# Patient Record
Sex: MALE | Race: WHITE | NOT HISPANIC OR LATINO | URBAN - METROPOLITAN AREA
[De-identification: names, ages, dates, MRNs, and addresses within clinical notes are randomized per-mention and may not be internally consistent; named-entity substitution may affect disease eponyms.]

---

## 2022-05-10 ENCOUNTER — NEW PATIENT (OUTPATIENT)
Dept: URBAN - METROPOLITAN AREA CLINIC 14 | Facility: CLINIC | Age: 72
End: 2022-05-10

## 2022-05-10 DIAGNOSIS — H02.834: ICD-10-CM

## 2022-05-10 DIAGNOSIS — H02.831: ICD-10-CM

## 2022-05-10 DIAGNOSIS — H02.423: ICD-10-CM

## 2022-05-10 PROCEDURE — 99204 OFFICE O/P NEW MOD 45 MIN: CPT

## 2022-05-10 PROCEDURE — 92285 EXTERNAL OCULAR PHOTOGRAPHY: CPT

## 2022-05-10 PROCEDURE — 92082 INTERMEDIATE VISUAL FIELD XM: CPT

## 2022-05-10 ASSESSMENT — VISUAL ACUITY
OS_SC: 20/30
OD_SC: 20/30

## 2022-05-10 NOTE — PATIENT DISCUSSION
I have discussed with the patient the option of levator advancement surgery to lift the upper eyelid position and improve the functional visual field.

## 2022-06-29 ENCOUNTER — POST-OP (OUTPATIENT)
Dept: URBAN - METROPOLITAN AREA CLINIC 14 | Facility: CLINIC | Age: 72
End: 2022-06-29

## 2022-06-29 DIAGNOSIS — H02.834: ICD-10-CM

## 2022-06-29 DIAGNOSIS — Z98.890: ICD-10-CM

## 2022-06-29 DIAGNOSIS — H02.423: ICD-10-CM

## 2022-06-29 DIAGNOSIS — H02.831: ICD-10-CM

## 2022-06-29 PROCEDURE — 99024 POSTOP FOLLOW-UP VISIT: CPT

## 2022-06-29 ASSESSMENT — VISUAL ACUITY
OD_SC: 20/30
OS_SC: 20/30

## 2024-12-02 ENCOUNTER — COMPREHENSIVE EXAM (OUTPATIENT)
Age: 74
End: 2024-12-02

## 2024-12-02 DIAGNOSIS — H25.13: ICD-10-CM

## 2024-12-02 PROCEDURE — 99199ADVT ADVANCED VISION TESTING: Mod: NC

## 2024-12-02 PROCEDURE — 99211NC NO CHARGE VISIT: Mod: NC

## 2024-12-05 ENCOUNTER — COMPREHENSIVE EXAM (OUTPATIENT)
Age: 74
End: 2024-12-05

## 2024-12-05 DIAGNOSIS — H43.823: ICD-10-CM

## 2024-12-05 DIAGNOSIS — H52.13: ICD-10-CM

## 2024-12-05 PROCEDURE — 92134 CPTRZ OPH DX IMG PST SGM RTA: CPT

## 2024-12-05 PROCEDURE — 92014 COMPRE OPH EXAM EST PT 1/>: CPT

## 2024-12-05 PROCEDURE — 92201 OPSCPY EXTND RTA DRAW UNI/BI: CPT

## 2024-12-17 ENCOUNTER — POST OP/EVAL OF SECOND EYE (OUTPATIENT)
Age: 74
End: 2024-12-17

## 2024-12-17 ENCOUNTER — SURGERY/PROCEDURE (OUTPATIENT)
Age: 74
End: 2024-12-17

## 2024-12-17 DIAGNOSIS — Z96.1: ICD-10-CM

## 2024-12-17 DIAGNOSIS — H25.13: ICD-10-CM

## 2024-12-17 DIAGNOSIS — Z98.890: ICD-10-CM

## 2024-12-17 DIAGNOSIS — H25.11: ICD-10-CM

## 2024-12-17 PROCEDURE — 99199PPLAL SPECIAL SERVICE OR REPORT INSERT LAL LENS

## 2024-12-17 PROCEDURE — P6698455 NON-COMANAGED ADVANCED PO

## 2024-12-17 PROCEDURE — 66984LAL REMOVE CATARACT; INSERT LAL LENS

## 2024-12-18 ENCOUNTER — SURGERY/PROCEDURE (OUTPATIENT)
Age: 74
End: 2024-12-18

## 2024-12-18 DIAGNOSIS — H25.13: ICD-10-CM

## 2024-12-18 PROCEDURE — 66984LAL REMOVE CATARACT; INSERT LAL LENS: Mod: 79,RT

## 2024-12-18 PROCEDURE — 99199PPLAL SPECIAL SERVICE OR REPORT INSERT LAL LENS

## 2024-12-19 ENCOUNTER — POST-OP (OUTPATIENT)
Age: 74
End: 2024-12-19

## 2024-12-19 DIAGNOSIS — Z96.1: ICD-10-CM

## 2024-12-19 DIAGNOSIS — Z98.890: ICD-10-CM

## 2024-12-19 PROCEDURE — P6698455 NON-COMANAGED ADVANCED PO

## 2025-01-08 ENCOUNTER — POST-OP (OUTPATIENT)
Age: 75
End: 2025-01-08

## 2025-01-08 DIAGNOSIS — Z98.890: ICD-10-CM

## 2025-01-08 PROCEDURE — 66999LALA LAL ADJUSTMENT: Mod: NC

## 2025-01-13 ENCOUNTER — POST-OP (OUTPATIENT)
Age: 75
End: 2025-01-13

## 2025-01-13 DIAGNOSIS — Z96.1: ICD-10-CM

## 2025-01-13 DIAGNOSIS — Z98.890: ICD-10-CM

## 2025-01-13 PROCEDURE — 66999LALA LAL ADJUSTMENT: Mod: NC,LT

## 2025-01-16 ENCOUNTER — POST-OP (OUTPATIENT)
Age: 75
End: 2025-01-16

## 2025-01-16 DIAGNOSIS — Z98.890: ICD-10-CM

## 2025-01-16 DIAGNOSIS — Z96.1: ICD-10-CM

## 2025-01-16 PROCEDURE — 66999LALA LAL ADJUSTMENT: Mod: NC,RT

## 2025-01-16 PROCEDURE — 66999LALA LAL ADJUSTMENT: Mod: NC,LT

## 2025-01-27 ENCOUNTER — POST-OP (OUTPATIENT)
Age: 75
End: 2025-01-27

## 2025-01-27 DIAGNOSIS — Z96.1: ICD-10-CM

## 2025-01-27 PROCEDURE — 99024 POSTOP FOLLOW-UP VISIT: CPT | Mod: NC

## 2025-02-03 ENCOUNTER — POST-OP (OUTPATIENT)
Age: 75
End: 2025-02-03

## 2025-02-03 DIAGNOSIS — Z96.1: ICD-10-CM

## 2025-02-03 DIAGNOSIS — Z98.890: ICD-10-CM

## 2025-02-03 PROCEDURE — 66999LALA LAL ADJUSTMENT: Mod: NC

## 2025-02-10 ENCOUNTER — POST-OP (OUTPATIENT)
Age: 75
End: 2025-02-10

## 2025-02-10 DIAGNOSIS — Z96.1: ICD-10-CM

## 2025-02-10 PROCEDURE — 66999LALA LAL ADJUSTMENT: Mod: NC,LT

## 2025-02-10 PROCEDURE — 66999LALA LAL ADJUSTMENT: Mod: NC,RT

## 2025-02-19 ENCOUNTER — POST-OP (OUTPATIENT)
Age: 75
End: 2025-02-19

## 2025-02-19 DIAGNOSIS — Z96.1: ICD-10-CM

## 2025-02-19 PROCEDURE — 66999LALA LAL ADJUSTMENT: Mod: NC,LT

## 2025-02-19 PROCEDURE — 66999LALA LAL ADJUSTMENT: Mod: NC

## 2025-03-03 ENCOUNTER — POST-OP (OUTPATIENT)
Age: 75
End: 2025-03-03

## 2025-03-03 DIAGNOSIS — Z96.1: ICD-10-CM

## 2025-03-03 PROCEDURE — 66999LALA LAL ADJUSTMENT: Mod: NC,LT

## 2025-03-06 ENCOUNTER — POST-OP (OUTPATIENT)
Age: 75
End: 2025-03-06

## 2025-03-06 DIAGNOSIS — Z96.1: ICD-10-CM

## 2025-03-06 PROCEDURE — 66999LALA LAL ADJUSTMENT: Mod: NC,LT

## 2025-04-28 ENCOUNTER — POST-OP (OUTPATIENT)
Age: 75
End: 2025-04-28

## 2025-04-28 DIAGNOSIS — Z96.1: ICD-10-CM

## 2025-05-14 ENCOUNTER — POST-OP (OUTPATIENT)
Age: 75
End: 2025-05-14

## 2025-05-14 DIAGNOSIS — Z96.1: ICD-10-CM

## 2025-05-14 PROCEDURE — 99024 POSTOP FOLLOW-UP VISIT: CPT | Mod: NC

## 2025-06-02 ENCOUNTER — CONSULTATION/EVALUATION (OUTPATIENT)
Age: 75
End: 2025-06-02

## 2025-06-02 DIAGNOSIS — Z96.1: ICD-10-CM

## 2025-06-02 DIAGNOSIS — Z98.890: ICD-10-CM

## 2025-06-02 DIAGNOSIS — H04.122: ICD-10-CM

## 2025-06-02 PROCEDURE — 99214 OFFICE O/P EST MOD 30 MIN: CPT

## 2025-06-17 ENCOUNTER — FOLLOW UP (OUTPATIENT)
Age: 75
End: 2025-06-17

## 2025-06-17 DIAGNOSIS — H04.122: ICD-10-CM

## 2025-06-17 PROCEDURE — 92012 INTRM OPH EXAM EST PATIENT: CPT

## 2025-06-25 ENCOUNTER — FOLLOW UP (OUTPATIENT)
Age: 75
End: 2025-06-25

## 2025-06-25 DIAGNOSIS — H04.122: ICD-10-CM

## 2025-06-25 DIAGNOSIS — Z96.1: ICD-10-CM

## 2025-06-25 PROCEDURE — 99214 OFFICE O/P EST MOD 30 MIN: CPT

## 2025-07-08 ENCOUNTER — CLINIC PROCEDURE ONLY (OUTPATIENT)
Age: 75
End: 2025-07-08

## 2025-07-08 DIAGNOSIS — H16.232: ICD-10-CM

## 2025-07-08 PROCEDURE — 65778 COVER EYE W/MEMBRANE: CPT | Mod: 79,LT

## 2025-07-08 RX ORDER — OFLOXACIN 3 MG/ML
1 SOLUTION/ DROPS OPHTHALMIC
Start: 2025-07-08

## 2025-07-10 ENCOUNTER — FOLLOW UP (OUTPATIENT)
Age: 75
End: 2025-07-10

## 2025-07-10 DIAGNOSIS — H16.142: ICD-10-CM

## 2025-07-10 DIAGNOSIS — H16.232: ICD-10-CM

## 2025-07-10 PROCEDURE — 99213 OFFICE O/P EST LOW 20 MIN: CPT

## 2025-07-14 ENCOUNTER — CLINIC PROCEDURE ONLY (OUTPATIENT)
Age: 75
End: 2025-07-14

## 2025-07-14 DIAGNOSIS — H16.232: ICD-10-CM

## 2025-07-14 PROCEDURE — 65778 COVER EYE W/MEMBRANE: CPT | Mod: 79,LT

## 2025-07-14 RX ORDER — OFLOXACIN 3 MG/ML
1 SOLUTION/ DROPS OPHTHALMIC
Start: 2025-07-14 | End: 2025-07-16

## 2025-07-16 ENCOUNTER — FOLLOW UP (OUTPATIENT)
Age: 75
End: 2025-07-16

## 2025-07-16 DIAGNOSIS — H16.142: ICD-10-CM

## 2025-07-16 DIAGNOSIS — H16.232: ICD-10-CM

## 2025-07-16 PROCEDURE — 99213 OFFICE O/P EST LOW 20 MIN: CPT

## 2025-07-31 ENCOUNTER — FOLLOW UP (OUTPATIENT)
Age: 75
End: 2025-07-31

## 2025-07-31 DIAGNOSIS — H16.232: ICD-10-CM

## 2025-07-31 DIAGNOSIS — H16.142: ICD-10-CM

## 2025-07-31 PROCEDURE — 99213 OFFICE O/P EST LOW 20 MIN: CPT | Mod: 24
